# Patient Record
Sex: MALE | Race: OTHER | Employment: FULL TIME | URBAN - METROPOLITAN AREA
[De-identification: names, ages, dates, MRNs, and addresses within clinical notes are randomized per-mention and may not be internally consistent; named-entity substitution may affect disease eponyms.]

---

## 2019-12-19 NOTE — H&P
[facility]    History and Physical    Sadia Truong Patient Status:  Hospital Outpatient Surgery    1975 MRN [id number]   Location [place] [address] Attending Mamadou Oscar MD   Hosp Day # 0 PCP No primary care pr Signs:  Height 5' 11\" (1.803 m), weight 205 lb (93 kg). Physical Exam:  Exam of the left knee showed full extension and full deep flexion without effusion. Patella grind was negative for crepitus and pain. He had no instability.  He had some anterior late

## 2019-12-20 ENCOUNTER — HOSPITAL ENCOUNTER (OUTPATIENT)
Facility: HOSPITAL | Age: 44
Setting detail: HOSPITAL OUTPATIENT SURGERY
Discharge: HOME | End: 2019-12-20
Attending: ORTHOPAEDIC SURGERY | Admitting: ORTHOPAEDIC SURGERY
Payer: OTHER MISCELLANEOUS

## 2019-12-20 ENCOUNTER — ANESTHESIA (OUTPATIENT)
Dept: SURGERY | Facility: HOSPITAL | Age: 44
End: 2019-12-20
Payer: OTHER MISCELLANEOUS

## 2019-12-20 ENCOUNTER — ANESTHESIA EVENT (OUTPATIENT)
Dept: SURGERY | Facility: HOSPITAL | Age: 44
End: 2019-12-20
Payer: OTHER MISCELLANEOUS

## 2019-12-20 VITALS
RESPIRATION RATE: 12 BRPM | BODY MASS INDEX: 29.96 KG/M2 | HEART RATE: 56 BPM | HEIGHT: 71 IN | SYSTOLIC BLOOD PRESSURE: 119 MMHG | TEMPERATURE: 98 F | OXYGEN SATURATION: 98 % | WEIGHT: 214 LBS | DIASTOLIC BLOOD PRESSURE: 81 MMHG

## 2019-12-20 PROBLEM — S83.8X2A ACUTE LATERAL MENISCAL INJURY OF LEFT KNEE: Status: ACTIVE | Noted: 2019-12-20

## 2019-12-20 PROCEDURE — 0SBD4ZZ EXCISION OF LEFT KNEE JOINT, PERCUTANEOUS ENDOSCOPIC APPROACH: ICD-10-PCS | Performed by: ORTHOPAEDIC SURGERY

## 2019-12-20 RX ORDER — DEXAMETHASONE SODIUM PHOSPHATE 4 MG/ML
VIAL (ML) INJECTION AS NEEDED
Status: DISCONTINUED | OUTPATIENT
Start: 2019-12-20 | End: 2019-12-20 | Stop reason: SURG

## 2019-12-20 RX ORDER — ONDANSETRON 2 MG/ML
INJECTION INTRAMUSCULAR; INTRAVENOUS AS NEEDED
Status: DISCONTINUED | OUTPATIENT
Start: 2019-12-20 | End: 2019-12-20 | Stop reason: SURG

## 2019-12-20 RX ORDER — HALOPERIDOL 5 MG/ML
0.25 INJECTION INTRAMUSCULAR ONCE AS NEEDED
Status: DISCONTINUED | OUTPATIENT
Start: 2019-12-20 | End: 2019-12-20

## 2019-12-20 RX ORDER — IBUPROFEN 200 MG
600 TABLET ORAL EVERY 6 HOURS PRN
Qty: 30 TABLET | Refills: 0 | Status: SHIPPED | OUTPATIENT
Start: 2019-12-20

## 2019-12-20 RX ORDER — CEFAZOLIN SODIUM/WATER 2 G/20 ML
2 SYRINGE (ML) INTRAVENOUS ONCE
Status: COMPLETED | OUTPATIENT
Start: 2019-12-20 | End: 2019-12-20

## 2019-12-20 RX ORDER — ACETAMINOPHEN 500 MG
1000 TABLET ORAL ONCE
Status: COMPLETED | OUTPATIENT
Start: 2019-12-20 | End: 2019-12-20

## 2019-12-20 RX ORDER — HYDROCODONE BITARTRATE AND ACETAMINOPHEN 5; 325 MG/1; MG/1
1 TABLET ORAL AS NEEDED
Status: DISCONTINUED | OUTPATIENT
Start: 2019-12-20 | End: 2019-12-20

## 2019-12-20 RX ORDER — KETOROLAC TROMETHAMINE 30 MG/ML
INJECTION, SOLUTION INTRAMUSCULAR; INTRAVENOUS AS NEEDED
Status: DISCONTINUED | OUTPATIENT
Start: 2019-12-20 | End: 2019-12-20 | Stop reason: SURG

## 2019-12-20 RX ORDER — NALOXONE HYDROCHLORIDE 0.4 MG/ML
80 INJECTION, SOLUTION INTRAMUSCULAR; INTRAVENOUS; SUBCUTANEOUS AS NEEDED
Status: DISCONTINUED | OUTPATIENT
Start: 2019-12-20 | End: 2019-12-20

## 2019-12-20 RX ORDER — TRAMADOL HYDROCHLORIDE 50 MG/1
50 TABLET ORAL EVERY 6 HOURS PRN
Qty: 20 TABLET | Refills: 0 | Status: SHIPPED | OUTPATIENT
Start: 2019-12-20

## 2019-12-20 RX ORDER — ASPIRIN 81 MG/1
81 TABLET ORAL 2 TIMES DAILY WITH MEALS
Qty: 24 TABLET | Refills: 0 | Status: SHIPPED | OUTPATIENT
Start: 2019-12-20 | End: 2020-01-01

## 2019-12-20 RX ORDER — ONDANSETRON 2 MG/ML
4 INJECTION INTRAMUSCULAR; INTRAVENOUS EVERY 6 HOURS PRN
Status: DISCONTINUED | OUTPATIENT
Start: 2019-12-20 | End: 2019-12-20

## 2019-12-20 RX ORDER — HYDROCODONE BITARTRATE AND ACETAMINOPHEN 5; 325 MG/1; MG/1
2 TABLET ORAL AS NEEDED
Status: DISCONTINUED | OUTPATIENT
Start: 2019-12-20 | End: 2019-12-20

## 2019-12-20 RX ORDER — LIDOCAINE HYDROCHLORIDE 10 MG/ML
INJECTION, SOLUTION EPIDURAL; INFILTRATION; INTRACAUDAL; PERINEURAL AS NEEDED
Status: DISCONTINUED | OUTPATIENT
Start: 2019-12-20 | End: 2019-12-20 | Stop reason: SURG

## 2019-12-20 RX ORDER — ONDANSETRON 2 MG/ML
4 INJECTION INTRAMUSCULAR; INTRAVENOUS ONCE AS NEEDED
Status: COMPLETED | OUTPATIENT
Start: 2019-12-20 | End: 2019-12-20

## 2019-12-20 RX ORDER — PROCHLORPERAZINE EDISYLATE 5 MG/ML
5 INJECTION INTRAMUSCULAR; INTRAVENOUS ONCE AS NEEDED
Status: DISCONTINUED | OUTPATIENT
Start: 2019-12-20 | End: 2019-12-20

## 2019-12-20 RX ORDER — SODIUM CHLORIDE, SODIUM LACTATE, POTASSIUM CHLORIDE, CALCIUM CHLORIDE 600; 310; 30; 20 MG/100ML; MG/100ML; MG/100ML; MG/100ML
INJECTION, SOLUTION INTRAVENOUS CONTINUOUS
Status: DISCONTINUED | OUTPATIENT
Start: 2019-12-20 | End: 2019-12-20

## 2019-12-20 RX ORDER — GLYCOPYRROLATE 0.2 MG/ML
INJECTION INTRAMUSCULAR; INTRAVENOUS AS NEEDED
Status: DISCONTINUED | OUTPATIENT
Start: 2019-12-20 | End: 2019-12-20 | Stop reason: SURG

## 2019-12-20 RX ORDER — DOXYCYCLINE HYCLATE 100 MG/1
100 CAPSULE ORAL 2 TIMES DAILY
COMMUNITY

## 2019-12-20 RX ORDER — ROPIVACAINE HYDROCHLORIDE 5 MG/ML
INJECTION, SOLUTION EPIDURAL; INFILTRATION; PERINEURAL AS NEEDED
Status: DISCONTINUED | OUTPATIENT
Start: 2019-12-20 | End: 2019-12-20 | Stop reason: HOSPADM

## 2019-12-20 RX ORDER — MIDAZOLAM HYDROCHLORIDE 1 MG/ML
INJECTION INTRAMUSCULAR; INTRAVENOUS AS NEEDED
Status: DISCONTINUED | OUTPATIENT
Start: 2019-12-20 | End: 2019-12-20 | Stop reason: SURG

## 2019-12-20 RX ADMIN — MIDAZOLAM HYDROCHLORIDE 2 MG: 1 INJECTION INTRAMUSCULAR; INTRAVENOUS at 12:08:00

## 2019-12-20 RX ADMIN — KETOROLAC TROMETHAMINE 30 MG: 30 INJECTION, SOLUTION INTRAMUSCULAR; INTRAVENOUS at 12:59:00

## 2019-12-20 RX ADMIN — GLYCOPYRROLATE 0.2 MG: 0.2 INJECTION INTRAMUSCULAR; INTRAVENOUS at 12:15:00

## 2019-12-20 RX ADMIN — ONDANSETRON 4 MG: 2 INJECTION INTRAMUSCULAR; INTRAVENOUS at 12:21:00

## 2019-12-20 RX ADMIN — CEFAZOLIN SODIUM/WATER 2 G: 2 G/20 ML SYRINGE (ML) INTRAVENOUS at 12:20:00

## 2019-12-20 RX ADMIN — LIDOCAINE HYDROCHLORIDE 50 MG: 10 INJECTION, SOLUTION EPIDURAL; INFILTRATION; INTRACAUDAL; PERINEURAL at 12:12:00

## 2019-12-20 RX ADMIN — DEXAMETHASONE SODIUM PHOSPHATE 4 MG: 4 MG/ML VIAL (ML) INJECTION at 12:21:00

## 2019-12-20 RX ADMIN — SODIUM CHLORIDE, SODIUM LACTATE, POTASSIUM CHLORIDE, CALCIUM CHLORIDE: 600; 310; 30; 20 INJECTION, SOLUTION INTRAVENOUS at 13:00:00

## 2019-12-20 NOTE — BRIEF OP NOTE
Pre-Operative Diagnosis: left knee 1) lateral meniscus tear, 2) lateral synovitis     Post-Operative Diagnosis: Same     Procedure Performed: left knee arthroscopy, 1) partial lateral meniscectomy, 2) lateral synovectomy    Surgeon(s) and Role: ARGENTINA Wheeler

## 2019-12-20 NOTE — ANESTHESIA PREPROCEDURE EVALUATION
Anesthesia PreOp Note    HPI:     Luz Marina Zimmer is a 38year old male who presents for preoperative consultation requested by: Porsha Mejias MD    Date of Surgery: 12/20/2019    Procedure(s):  KNEE ARTHROSCOPY  Indication: left knee lateral meniscus t Currently      Drug use: Never      Sexual activity: Not on file    Lifestyle      Physical activity:        Days per week: Not on file        Minutes per session: Not on file      Stress: Not on file    Relationships      Social connections:        Talks TIA, CVA    GI/Hepatic/Renal - negative ROS   (-) GERD, liver disease, renal disease    Endo/Other - negative ROS   (-) diabetes mellitus, blood dyscrasia  Abdominal                Anesthesia Plan:   ASA:  1  Plan:   General  Airway:  ETT  Informed Consent

## 2019-12-20 NOTE — INTERVAL H&P NOTE
Pre-op Diagnosis: left knee lateral meniscus tear    The above referenced H&P was reviewed by Bgig Thacker MD on 12/20/2019, the patient was examined and no significant changes have occurred in the patient's condition since the H&P was performed.   I di

## 2019-12-20 NOTE — ANESTHESIA POSTPROCEDURE EVALUATION
Patient: Shayy Rayo    Procedure Summary     Date:  12/20/19 Room / Location:  [address] [place] [address] MAIN OR 02 / [address] [facility] MAIN OR    Anesthesia Start:  4911 Anesthesia Stop:      Procedure:  KNEE ARTHROSCOPY (Left Knee) Diagnosis:  (left knee lateral meniscus tear)    Surge

## 2019-12-20 NOTE — INTERVAL H&P NOTE
Pre-op Diagnosis: left knee lateral meniscus tear    The above referenced H&P was reviewed by Ruthann Gates MD on 12/20/2019, the patient was examined and no significant changes have occurred in the patient's condition since the H&P was performed.   I di

## 2019-12-20 NOTE — ANESTHESIA PROCEDURE NOTES
Airway  Urgency: Elective      General Information and Staff    Patient location during procedure: OR  Anesthesiologist: Sharad Donald MD  Resident/CRNA: Tanya Kurtz CRNA  Performed: CRNA     Indications and Patient Condition  Indications for airway

## 2019-12-23 NOTE — OPERATIVE REPORT
[facility]    PATIENT'S NAME: Ramesh Melvin   ATTENDING PHYSICIAN: Marycruz Gabriel MD   OPERATING PHYSICIAN: Selena Gabriel MD   PATIENT ACCOUNT#:   [id number]    LOCATION:  [address]  MEDICAL RECORD #:   [id number]       DATE OF medial compartment showed a little synovitis of the anterior horn of the medial meniscus and this was just touched with an ArthroWand, but there was no meniscal tear. There was some chondromalacia on the medial femoral condyle, but no loose cartilage.   He

## 2023-04-24 ENCOUNTER — APPOINTMENT (OUTPATIENT)
Dept: URBAN - METROPOLITAN AREA CLINIC 314 | Age: 48
Setting detail: DERMATOLOGY
End: 2023-05-02

## 2023-04-24 DIAGNOSIS — L66.8 OTHER CICATRICIAL ALOPECIA: ICD-10-CM

## 2023-04-24 DIAGNOSIS — L73.1 PSEUDOFOLLICULITIS BARBAE: ICD-10-CM

## 2023-04-24 PROCEDURE — OTHER ADDITIONAL NOTES: OTHER

## 2023-04-24 PROCEDURE — OTHER COUNSELING: OTHER

## 2023-04-24 PROCEDURE — OTHER MIPS QUALITY: OTHER

## 2023-04-24 PROCEDURE — OTHER PRESCRIPTION: OTHER

## 2023-04-24 PROCEDURE — 99213 OFFICE O/P EST LOW 20 MIN: CPT | Mod: 25

## 2023-04-24 PROCEDURE — OTHER INTRALESIONAL KENALOG: OTHER

## 2023-04-24 PROCEDURE — 11900 INJECT SKIN LESIONS </W 7: CPT

## 2023-04-24 RX ORDER — DOXYCYCLINE HYCLATE 100 MG/1
CAPSULE, GELATIN COATED ORAL BID
Qty: 60 | Refills: 0 | Status: ERX | COMMUNITY
Start: 2023-04-24

## 2023-04-24 RX ORDER — CLINDAMYCIN PHOSPHATE 10 MG/ML
SOLUTION TOPICAL
Qty: 60 | Refills: 10 | Status: ERX | COMMUNITY
Start: 2023-04-24

## 2023-04-24 RX ORDER — ERYTHROMYCIN 20 MG/ML
SOLUTION TOPICAL
Qty: 60 | Refills: 10 | Status: ERX | COMMUNITY
Start: 2023-04-24

## 2023-04-24 ASSESSMENT — LOCATION SIMPLE DESCRIPTION DERM
LOCATION SIMPLE: CHIN
LOCATION SIMPLE: RIGHT LIP
LOCATION SIMPLE: LEFT CHEEK
LOCATION SIMPLE: POSTERIOR SCALP

## 2023-04-24 ASSESSMENT — LOCATION DETAILED DESCRIPTION DERM
LOCATION DETAILED: LEFT INFERIOR MEDIAL BUCCAL CHEEK
LOCATION DETAILED: RIGHT LOWER CUTANEOUS LIP
LOCATION DETAILED: LEFT SUPERIOR OCCIPITAL SCALP
LOCATION DETAILED: MID-OCCIPITAL SCALP
LOCATION DETAILED: LEFT OCCIPITAL SCALP
LOCATION DETAILED: LEFT POSTAURICULAR SKIN
LOCATION DETAILED: LEFT CHIN

## 2023-04-24 ASSESSMENT — LOCATION ZONE DERM
LOCATION ZONE: FACE
LOCATION ZONE: SCALP
LOCATION ZONE: LIP

## 2023-04-24 NOTE — PROCEDURE: MIPS QUALITY
Quality 110: Preventive Care And Screening: Influenza Immunization: Influenza Immunization Administered during Influenza season
Quality 130: Documentation Of Current Medications In The Medical Record: Current Medications Documented
Quality 226: Preventive Care And Screening: Tobacco Use: Screening And Cessation Intervention: Patient screened for tobacco use and is an ex/non-smoker
Quality 402: Tobacco Use And Help With Quitting Among Adolescents: Patient screened for tobacco and never smoked
Quality 431: Preventive Care And Screening: Unhealthy Alcohol Use - Screening: Patient not identified as an unhealthy alcohol user when screened for unhealthy alcohol use using a systematic screening method
Detail Level: Generalized

## 2023-04-24 NOTE — PROCEDURE: ADDITIONAL NOTES
Additional Notes: Patient agreed to keep his beard at approximately 1/4 length for a period of 2 years.
Detail Level: Simple
Render Risk Assessment In Note?: no

## 2023-09-26 ENCOUNTER — APPOINTMENT (OUTPATIENT)
Dept: URBAN - METROPOLITAN AREA CLINIC 313 | Age: 48
Setting detail: DERMATOLOGY
End: 2023-09-26

## 2023-09-26 DIAGNOSIS — L66.8 OTHER CICATRICIAL ALOPECIA: ICD-10-CM

## 2023-09-26 PROCEDURE — 99213 OFFICE O/P EST LOW 20 MIN: CPT | Mod: 25

## 2023-09-26 PROCEDURE — OTHER MIPS QUALITY: OTHER

## 2023-09-26 PROCEDURE — OTHER PRESCRIPTION MEDICATION MANAGEMENT: OTHER

## 2023-09-26 PROCEDURE — 11900 INJECT SKIN LESIONS </W 7: CPT

## 2023-09-26 PROCEDURE — OTHER COUNSELING: OTHER

## 2023-09-26 PROCEDURE — OTHER INTRALESIONAL KENALOG: OTHER

## 2023-09-26 PROCEDURE — OTHER SEPARATE AND IDENTIFIABLE DOCUMENTATION: OTHER

## 2023-09-26 PROCEDURE — OTHER PRESCRIPTION: OTHER

## 2023-09-26 RX ORDER — CLOBETASOL PROPIONATE 0.5 MG/G
OINTMENT TOPICAL
Qty: 60 | Refills: 2 | Status: ERX | COMMUNITY
Start: 2023-09-26

## 2023-09-26 RX ORDER — DOXYCYCLINE HYCLATE 100 MG/1
CAPSULE, GELATIN COATED ORAL
Qty: 60 | Refills: 2 | Status: ERX | COMMUNITY
Start: 2023-09-26

## 2023-09-26 ASSESSMENT — LOCATION DETAILED DESCRIPTION DERM
LOCATION DETAILED: POSTERIOR MID-PARIETAL SCALP
LOCATION DETAILED: RIGHT SUPERIOR PARIETAL SCALP
LOCATION DETAILED: RIGHT INFERIOR OCCIPITAL SCALP
LOCATION DETAILED: LEFT INFERIOR OCCIPITAL SCALP
LOCATION DETAILED: MID-OCCIPITAL SCALP
LOCATION DETAILED: LEFT POSTAURICULAR SKIN
LOCATION DETAILED: LEFT SUPERIOR OCCIPITAL SCALP

## 2023-09-26 ASSESSMENT — LOCATION SIMPLE DESCRIPTION DERM
LOCATION SIMPLE: LEFT OCCIPITAL SCALP
LOCATION SIMPLE: POSTERIOR SCALP
LOCATION SIMPLE: SCALP

## 2023-09-26 ASSESSMENT — LOCATION ZONE DERM: LOCATION ZONE: SCALP

## 2023-09-26 NOTE — PROCEDURE: PRESCRIPTION MEDICATION MANAGEMENT
Render In Strict Bullet Format?: Yes
Detail Level: Generalized
Plan: Up to two weeks of doxycycline for flares
Discontinue Regimen: Clindamycin

## 2023-09-26 NOTE — HPI: SKIN LESION
treated_been_treated Has Your Skin Lesion Been Treated?: been treated [Sore Throat] : sore throat [Fever] : fever [Negative] : Skin

## (undated) DEVICE — TUBING IRR 16FT CNT WV 3 ASCP

## (undated) DEVICE — BLADE SHVR COOLCUT 13CM 4MM

## (undated) DEVICE — ZIMMER® STERILE DISPOSABLE TOURNIQUET CUFF WITH PLC, DUAL PORT, SINGLE BLADDER, 30 IN. (76 CM)

## (undated) DEVICE — DRAPE SRG 70X60IN SPLT U IMPRV

## (undated) DEVICE — ZIMMER® STERILE DISPOSABLE TOURNIQUET CUFF WITH PLC, DUAL PORT, SINGLE BLADDER, 42 IN. (107 CM)

## (undated) DEVICE — AMBIENT SUPER MULTIVAC 50 WITH                                    INTEGRATED FINGER SWITCHES IFS: Brand: COBLATION

## (undated) DEVICE — GOWN SURG AERO BLUE PERF XLG

## (undated) DEVICE — GAMMEX® NON-LATEX PI ORTHO SIZE 7.5, STERILE POLYISOPRENE POWDER-FREE SURGICAL GLOVE: Brand: GAMMEX

## (undated) DEVICE — MEDI-VAC NON-CONDUCTIVE SUCTION TUBING: Brand: CARDINAL HEALTH

## (undated) DEVICE — ENCORE® LATEX ACCLAIM SIZE 7.5, STERILE LATEX POWDER-FREE SURGICAL GLOVE: Brand: ENCORE

## (undated) DEVICE — GAMMEX® NON-LATEX PI ORTHO SIZE 8, STERILE POLYISOPRENE POWDER-FREE SURGICAL GLOVE: Brand: GAMMEX

## (undated) DEVICE — ARTHROSCOPY: Brand: MEDLINE INDUSTRIES, INC.

## (undated) DEVICE — SOL  .9 3000ML

## (undated) DEVICE — SUCTION CANISTER, 3000CC,SAFELINER: Brand: DEROYAL

## (undated) DEVICE — SUTURE ETHILON 4-0 662G

## (undated) DEVICE — CHLORAPREP 26ML APPLICATOR